# Patient Record
Sex: FEMALE | Race: WHITE | NOT HISPANIC OR LATINO | Employment: UNEMPLOYED | URBAN - METROPOLITAN AREA
[De-identification: names, ages, dates, MRNs, and addresses within clinical notes are randomized per-mention and may not be internally consistent; named-entity substitution may affect disease eponyms.]

---

## 2024-11-01 NOTE — PROGRESS NOTES
Chief Complaint   Patient presents with    Follow-up     Family planning        Subjective   HPI  Alicia MC is a 24 y.o. female, , LMP was on Patient's last menstrual period was 10/22/2024 (exact date). who presents for Family Planning.    Her periods are irregular since coming off depoprovera 2 years ago,occur every 28-49  days.  Last 5 days. Mild menstrual cramps. She desires pregnancy.  Practicing intentional timed intercourse, using ovulation predictor - no ovulation.     Partner Status: Marital Status: .  New Partners since last visit: no.  She has intercourse approximately 2 times per week.  Desires STD Screening: no.  Her past medical history is not contributory.  She has not had a previous workup for infertility.  Her partner has not fathered children in previous relationships and his past medical history is not contributory.      C/o excessive hair growth of nipples, acne of the chin    Current Outpatient Medications on File Prior to Visit   Medication Sig Dispense Refill    multivitamin with minerals (MULTIVITAMIN WOMEN PO) Take 1 tablet by mouth Daily.       No current facility-administered medications on file prior to visit.      Additional OB/GYN History   History of abnormal Pap smear: yes -   23, LSIL, cannot exclude high grade dysplasia, colpo 10-26-23, benign  Exercises Regularly: yes, 3 x weekly [employer]  Feelings of Anxiety or Depression: no  Tobacco Usage?: No   OB History          0    Para   0    Term   0       0    AB   0    Living   0         SAB   0    IAB   0    Ectopic   0    Molar   0    Multiple   0    Live Births   0                The additional following portions of the patient's history were reviewed and updated as appropriate: allergies, current medications, past family history, past medical history, past social history, past surgical history, and problem list.    Review of Systems  All other systems reviewed and are negative.     I have  reviewed and agree with the HPI, ROS, and historical information as entered above. Naomie Perales Fredy, APRN    Objective   /82   Pulse 81   Wt 68.9 kg (152 lb)   LMP 10/22/2024 (Exact Date)   Breastfeeding No   BMI 28.72 kg/m²     Physical Exam  Vitals and nursing note reviewed.   Constitutional:       Appearance: Normal appearance. She is well-developed and well-groomed.   Cardiovascular:      Rate and Rhythm: Normal rate.   Pulmonary:      Effort: Pulmonary effort is normal.   Lymphadenopathy:      Cervical: No cervical adenopathy.   Skin:     General: Skin is warm and dry.   Neurological:      Mental Status: She is alert and oriented to person, place, and time.   Psychiatric:         Mood and Affect: Affect normal.         Cognition and Memory: Cognition normal.         Assessment & Plan     Assessment and Plan    Problem List Items Addressed This Visit    None  Visit Diagnoses       Pre-conception counseling    -  Primary    Irregular periods/menstrual cycles        Relevant Orders    Prolactin    TSH    CBC (No Diff)    DHEA-Sulfate    Insulin, Total    Glucose, Fasting    Testosterone    17-Hydroxyprogesterone    Antimullerian Hormone (AMH)            Lab(s) Ordered.  Ovulatory cycles discussed with the patient.  She understands the concept of timed intercourse.  We also discussed the importance of prenatal vitamins and folic acid.  Reviewed that only 30% of people get pregnant by 3 months, 50% x 6 months, and 90% by year.  Should the patient attempt to conceive for greater than 1 year she should return to the clinic for evaluation.  Reassurance.  Advised patient to call with a positive urine pregnancy test.  PNV daily and healthy lifestyle if desires pregnancy  Return for for wwe with me in 2 weeks if possible.      Naomie Daniel, APRN  11/07/2024

## 2024-11-07 ENCOUNTER — OFFICE VISIT (OUTPATIENT)
Dept: OBSTETRICS AND GYNECOLOGY | Facility: CLINIC | Age: 24
End: 2024-11-07
Payer: COMMERCIAL

## 2024-11-07 VITALS
WEIGHT: 152 LBS | SYSTOLIC BLOOD PRESSURE: 115 MMHG | DIASTOLIC BLOOD PRESSURE: 82 MMHG | BODY MASS INDEX: 28.72 KG/M2 | HEART RATE: 81 BPM

## 2024-11-07 DIAGNOSIS — Z31.69 PRE-CONCEPTION COUNSELING: Primary | ICD-10-CM

## 2024-11-07 DIAGNOSIS — N92.6 IRREGULAR PERIODS/MENSTRUAL CYCLES: ICD-10-CM

## 2024-11-07 PROCEDURE — 83525 ASSAY OF INSULIN: CPT | Performed by: NURSE PRACTITIONER

## 2024-11-08 LAB — INSULIN SERPL-ACNC: 20.4 UIU/ML (ref 2.6–24.9)

## 2025-07-24 ENCOUNTER — TELEPHONE (OUTPATIENT)
Dept: OBSTETRICS AND GYNECOLOGY | Age: 25
End: 2025-07-24

## 2025-08-15 ENCOUNTER — OFFICE VISIT (OUTPATIENT)
Dept: FAMILY MEDICINE CLINIC | Facility: CLINIC | Age: 25
End: 2025-08-15
Payer: COMMERCIAL

## 2025-08-15 VITALS
TEMPERATURE: 98.7 F | BODY MASS INDEX: 28.57 KG/M2 | HEART RATE: 103 BPM | DIASTOLIC BLOOD PRESSURE: 84 MMHG | OXYGEN SATURATION: 99 % | WEIGHT: 151.2 LBS | SYSTOLIC BLOOD PRESSURE: 122 MMHG

## 2025-08-15 DIAGNOSIS — Z32.00 POSSIBLE PREGNANCY: Primary | ICD-10-CM

## 2025-08-15 LAB — HCG INTACT+B SERPL-ACNC: 199 MIU/ML

## 2025-08-15 PROCEDURE — 84702 CHORIONIC GONADOTROPIN TEST: CPT | Performed by: FAMILY MEDICINE

## 2025-08-15 RX ORDER — PNV NO.95/FERROUS FUM/FOLIC AC 28MG-0.8MG
1 TABLET ORAL DAILY
Qty: 30 TABLET | Refills: 0 | Status: SHIPPED | OUTPATIENT
Start: 2025-08-15

## 2025-08-15 RX ORDER — DIPHENHYDRAMINE HYDROCHLORIDE 25 MG/1
25 CAPSULE ORAL DAILY PRN
Qty: 30 TABLET | Refills: 0 | Status: SHIPPED | OUTPATIENT
Start: 2025-08-15

## 2025-08-18 ENCOUNTER — RESULTS FOLLOW-UP (OUTPATIENT)
Dept: FAMILY MEDICINE CLINIC | Facility: CLINIC | Age: 25
End: 2025-08-18
Payer: COMMERCIAL

## 2025-08-26 ENCOUNTER — INITIAL PRENATAL (OUTPATIENT)
Dept: OBSTETRICS AND GYNECOLOGY | Facility: CLINIC | Age: 25
End: 2025-08-26
Payer: COMMERCIAL

## 2025-08-26 VITALS — DIASTOLIC BLOOD PRESSURE: 80 MMHG | WEIGHT: 150 LBS | SYSTOLIC BLOOD PRESSURE: 126 MMHG | BODY MASS INDEX: 28.34 KG/M2

## 2025-08-26 DIAGNOSIS — Z34.91 INITIAL OBSTETRIC VISIT IN FIRST TRIMESTER: Primary | ICD-10-CM

## 2025-08-26 LAB
B-HCG UR QL: POSITIVE
EXPIRATION DATE: ABNORMAL
INTERNAL NEGATIVE CONTROL: NEGATIVE
INTERNAL POSITIVE CONTROL: POSITIVE
Lab: ABNORMAL

## 2025-08-27 LAB
ACCEPTABLE CREAT UR QL: 84.1 MG/DL (ref 20–300)
AMPHETAMINES UR QL SCN: NEGATIVE NG/ML
BARBITURATES UR QL SCN: NEGATIVE NG/ML
BENZODIAZ UR QL: NEGATIVE NG/ML
BZE UR QL: NEGATIVE NG/ML
CANNABINOIDS UR QL SCN: NEGATIVE NG/ML
METHADONE UR QL SCN: NEGATIVE NG/ML
NITRITE UR QL STRIP: NEGATIVE MCG/ML
OPIATES UR QL SCN: NEGATIVE NG/ML
PCP UR QL SCN: NEGATIVE NG/ML
PH UR: 5.5 [PH] (ref 4.5–8.9)
PROPOXYPH UR QL SCN: NEGATIVE NG/ML

## 2025-08-28 LAB
BACTERIA UR CULT: NO GROWTH
BACTERIA UR CULT: NORMAL